# Patient Record
Sex: MALE | Race: WHITE
[De-identification: names, ages, dates, MRNs, and addresses within clinical notes are randomized per-mention and may not be internally consistent; named-entity substitution may affect disease eponyms.]

---

## 2018-03-22 ENCOUNTER — HOSPITAL ENCOUNTER (OUTPATIENT)
Dept: HOSPITAL 45 - C.MRIBC | Age: 83
Discharge: HOME | End: 2018-03-22
Attending: PHYSICIAN ASSISTANT
Payer: COMMERCIAL

## 2018-03-22 DIAGNOSIS — M54.16: Primary | ICD-10-CM

## 2018-03-22 DIAGNOSIS — M51.26: ICD-10-CM

## 2018-03-22 NOTE — DIAGNOSTIC IMAGING REPORT
LUMBAR SPINE MRI



HISTORY:      Back pain. Right hip pain.



TECHNIQUE: Multiplanar multisequence MRI of the lumbar spine was performed

without the use of contrast.



COMPARISON:  None.



FINDINGS: For the purpose of the report the L5-S1 disc space will be located on

axial image 23 of 25.

There is 3 mm of retrolisthesis of L5 on S1. Severe disc space. L5-S1. Moderate

to space narrowing at L2-L3. Mild superior endplate compression fractures at T12

and L2. No associated marrow edema. Therefore, these are considered to be old.

No associated retropulsion. The conus terminates at the L1 level. S1 is

demonstrated to be a transitional vertebra with a hypoplastic S1-S2 disc space.

Mild facet degenerative changes within the lumbar spine. 1.7 cm hyperintense

area within the left iliac bone consistent with a sclerotic focus. This favors a

bone island.



L1-L2: Small broad-based posterior disc bulge. No significant central canal or

neural foraminal narrowing.



L2-L3: No significant central canal or neural foraminal narrowing.



L3-L4: No significant central canal narrowing. Mild left-sided neural foraminal

narrowing due to a small left foraminal focal disc protrusion. This does not

abut the exiting nerve root.



L4-L5: Small broad-based posterior disc bulge without significant central canal

narrowing. There is mild left-sided neural foraminal narrowing due to the facet

hypertrophy. Best is on axial image 16 there is a 8 x 5 mm round right

paracentral subligamentous lesion. This favors a sequestered disc fragment. This

abuts and compresses the exiting right L4 nerve root at this level.



L5-S1: No significant central canal or neural foraminal narrowing.



IMPRESSION:  



1. An 8 x 5 mm right paracentral sequestered disc fragment posterior to the L4

vertebral body. This abuts and compresses the exiting right L4 nerve root.

2. Old mild superior endplate compression fractures at T12 and L2.

3. No central canal narrowing.







Electronically signed by:  Skyler Crain M.D.

3/22/2018 3:54 PM



Dictated Date/Time:  3/22/2018 3:43 PM

## 2018-04-16 ENCOUNTER — HOSPITAL ENCOUNTER (OUTPATIENT)
Dept: HOSPITAL 45 - X.SURG | Age: 83
Discharge: HOME | End: 2018-04-16
Attending: PHYSICAL MEDICINE & REHABILITATION
Payer: COMMERCIAL

## 2018-04-16 VITALS
BODY MASS INDEX: 24.55 KG/M2 | HEIGHT: 70.98 IN | WEIGHT: 175.38 LBS | HEIGHT: 70.98 IN | WEIGHT: 175.38 LBS | BODY MASS INDEX: 24.55 KG/M2

## 2018-04-16 VITALS — SYSTOLIC BLOOD PRESSURE: 137 MMHG | HEART RATE: 66 BPM | OXYGEN SATURATION: 97 % | DIASTOLIC BLOOD PRESSURE: 81 MMHG

## 2018-04-16 DIAGNOSIS — Z79.899: ICD-10-CM

## 2018-04-16 DIAGNOSIS — Z79.82: ICD-10-CM

## 2018-04-16 DIAGNOSIS — Z79.02: ICD-10-CM

## 2018-04-16 DIAGNOSIS — M51.16: Primary | ICD-10-CM

## 2018-04-16 NOTE — DISCHARGE INSTRUCTIONS
Discharge Instructions


Date of Service


Apr 16, 2018.





Visit


Reason for Visit:  Lumbar Disc Herniation With Radiculopathy





Discharge


Discharge Diagnosis / Problem:  Right leg pain





Discharge Goals


Goal(s):  Decrease discomfort, Improve function





Activity Recommendations


Activity Limitations:  resume your previous activity





Anesthesia


.





Post Anesthesia Instructions:





If you have had General Anesthesia or IV Sedation:





*  Do not drive today.


*  Resume driving when surgeon permits.


*  Do not make important decisions or sign legal documents today.


*  Call surgeon for:





   1.  Temperature elevations greater than 101 degrees F.


   2.  Uncontrollable pain.


   3.  Excessive bleeding.


   4.  Persistent nausea and vomiting.


   5.  Medication intolerance (nausea, vomiting or rash).





*  For nausea and vomiting use only clear liquids such as: tea, soda, bouillon 

until nausea subsides, then gradually increase diet as tolerated.





*  If you have any concerns or questions, call your surgeon's office.  If 

physician is unavailable and it is an emergency, call 911 or go to the nearest 

emergency room.





.





Diet Recommendations


Recommended Home Diet:  resume previous diet





Procedures


Procedures Performed:  


Caudal Epidural Steroid Injection





Pending Studies


Studies pending at discharge:  no





Medical Emergencies








.


Who to Call and When:





Medical Emergencies:  If at any time you feel your situation is an emergency, 

please call 911 immediately.





.





Non-Emergent Contact


Non-Emergency issues call your:  Specialist





.


.








"Provider Documentation" section prepared by Thad Lundberg.








.

## 2018-04-16 NOTE — OPERATIVE REPORT
DATE OF OPERATION:  04/16/2018

 

PREOPERATIVE DIAGNOSES:  L4-L5 herniated nucleus pulposus with sequestered

fragment with a L4 radiculopathy.

 

POSTOPERATIVE DIAGNOSIS:  Same.

 

PROCEDURE:  Caudal epidural steroid injection under fluoroscopic guidance.

 

INDICATIONS:  The patient is an 82-year-old white male who has a significant

sequestered fragment and a right L4 radiculopathy.  He presents today for an

epidural injection.  He will be given the caudal approach as he took his

Plavix and aspirin and did not hold them.

 

PHYSICAL EXAMINATION:  Pleasant male, seated comfortably.  He has discomfort

in the right leg, uncomfortable to bend forward, more comfortable ambulating

in a prone position with a long walking stick of the left leg as he

circumducts advance his leg.  No instability of the knee was present.

 

CONSENT:  Verbal and written consent was obtained from the patient.  Risks

and benefits were reviewed.  Risks include but are not limited to abscess and

allergic reaction.  The patient wishes to proceed.

 

DESCRIPTION OF PROCEDURE:  The patient was taken back to the special

procedures room of the [facility] where he was maintained

in a prone position.  Backside was cleansed with Betadine x3 and a dry

sterile dressing was applied.  Fluoroscope was used to identify sacral hiatus

and overlying skin was anesthetized with 2.5 mL of lidocaine 1% with a 25

gauge 1.5-inch needle.  A 25 gauge 3.5 inch spinal needle was then directed

under fluoroscopic guidance into the canal and underwent injection after

negative aspiration of 40 mg Depo-Medrol and 4 mL of preservative free sodium

chloride.  Injection was well tolerated.

 

DISPOSITION:

1.  The patient is taken out into the discharge recovery area where he will

be discharged home once discharge criteria have been met.

2.  Follow up in the [facility] office in 2-4 weeks.

 

 

I attest to the content of the Intraoperative Record and any orders documented therein. Any exception
s are noted below.

## 2018-04-16 NOTE — [facility] POST OPERATIVE BRIEF NOTE
Immediate Operative Summary


Operative Date


Apr 16, 2018.





Pre-Operative Diagnosis





L4-5 HNP with sequestered fragment with a right L4 radiculopathy.





Left CVA with right hemiparesis.





Post-Operative Diagnosis





Same





Procedure(s) Performed





Caudal Epidural Steroid Injection





Surgeon


Dr Thad Lundberg





Assistant Surgeon(s)


None





Estimated Blood Loss


0





Findings


Consistent with Post-Op Diagnosis





Specimens





NA





Drains


None





Anesthesia Type


Local





Complication(s)


none





Disposition


Disposition: